# Patient Record
Sex: FEMALE | Race: BLACK OR AFRICAN AMERICAN | Employment: OTHER | URBAN - METROPOLITAN AREA
[De-identification: names, ages, dates, MRNs, and addresses within clinical notes are randomized per-mention and may not be internally consistent; named-entity substitution may affect disease eponyms.]

---

## 2017-02-16 ENCOUNTER — HOSPITAL ENCOUNTER (OUTPATIENT)
Dept: LAB | Age: 47
Discharge: HOME | End: 2017-02-16
Attending: PLASTIC SURGERY
Payer: COMMERCIAL

## 2017-02-16 VITALS — WEIGHT: 175 LBS | HEIGHT: 69 IN | BODY MASS INDEX: 25.92 KG/M2

## 2017-02-16 LAB — HGB BLD-MCNC: 12.3 G/DL (ref 11.7–15.4)

## 2017-02-16 PROCEDURE — 36415 COLL VENOUS BLD VENIPUNCTURE: CPT | Performed by: ANESTHESIOLOGY

## 2017-02-16 PROCEDURE — 85018 HEMOGLOBIN: CPT | Performed by: ANESTHESIOLOGY

## 2017-02-16 RX ORDER — TEMAZEPAM 15 MG/1
15 CAPSULE ORAL
COMMUNITY

## 2017-02-16 RX ORDER — CLONAZEPAM 1 MG/1
2 TABLET ORAL
COMMUNITY

## 2017-02-16 NOTE — PERIOP NOTES
Hgb result within anesthesia guidelines.     Recent Results (from the past 12 hour(s))   HEMOGLOBIN    Collection Time: 02/16/17  2:30 PM   Result Value Ref Range    HGB 12.3 11.7 - 15.4 g/dL

## 2017-02-16 NOTE — PERIOP NOTES
Patient verified name, , and surgery as listed in [facility]. Type 2 surgery, phone assessment complete. Orders not received. Labs per surgeon: no orders on chart at this time  Labs per anesthesia protocol: hgb needed- Pt instructed to go to outpatient lab at Entrance B for blood draw Mon-Fri [id number] prior to day of surgery. Pt verbalizes understanding. Order placed in EMR on hold for arrival.    Pt followed by [facility] for ventricular ectopy- most recent office note (2016) in care everywhere and printed for anesthesia reference. Patient answered medical/surgical history questions at their best of ability. All prior to admission medications documented in [facility]. Patient instructed to take the following medications the day of surgery according to anesthesia guidelines with a small sip of water: omeprazole PRN, amlodipine, metoprolol. Hold all vitamins 7 days prior to surgery and NSAIDS 5 days prior to surgery. Medications to be held- mobic. Patient instructed on the following and verbalizes understanding:  Arrive at [facility], time of arrival to be called the day before by 1700  NPO after midnight including gum, mints, and ice chips  Responsible adult must drive patient to the hospital, stay during surgery, and patient will  need supervision 24 hours after anesthesia  Use Hibiclens in shower the night before surgery and on the morning of surgery  Leave all valuables(money and jewelry) at home but bring insurance card and ID on DOS  Do not wear make-up, nail polish, lotions, cologne, perfumes, powders, or oil on skin.

## 2017-02-17 ENCOUNTER — HOSPITAL ENCOUNTER (OUTPATIENT)
Dept: SURGERY | Age: 47
Discharge: HOME | End: 2017-02-17

## 2017-02-21 ENCOUNTER — ANESTHESIA EVENT (OUTPATIENT)
Dept: SURGERY | Age: 47
End: 2017-02-21
Payer: COMMERCIAL

## 2017-02-22 ENCOUNTER — HOSPITAL ENCOUNTER (OUTPATIENT)
Age: 47
Setting detail: OUTPATIENT SURGERY
Discharge: HOME | End: 2017-02-22
Attending: PLASTIC SURGERY | Admitting: PLASTIC SURGERY
Payer: COMMERCIAL

## 2017-02-22 ENCOUNTER — ANESTHESIA (OUTPATIENT)
Dept: SURGERY | Age: 47
End: 2017-02-22
Payer: COMMERCIAL

## 2017-02-22 ENCOUNTER — SURGERY (OUTPATIENT)
Age: 47
End: 2017-02-22

## 2017-02-22 VITALS
HEART RATE: 72 BPM | DIASTOLIC BLOOD PRESSURE: 79 MMHG | OXYGEN SATURATION: 93 % | TEMPERATURE: 98.5 F | SYSTOLIC BLOOD PRESSURE: 120 MMHG | RESPIRATION RATE: 18 BRPM

## 2017-02-22 PROCEDURE — 77030031139 HC SUT VCRL2 J&J -A: Performed by: PLASTIC SURGERY

## 2017-02-22 PROCEDURE — 74011250637 HC RX REV CODE- 250/637: Performed by: ANESTHESIOLOGY

## 2017-02-22 PROCEDURE — 77030011640 HC PAD GRND REM COVD -A: Performed by: PLASTIC SURGERY

## 2017-02-22 PROCEDURE — 77030013214 HC SUPP BRST CARD -B: Performed by: PLASTIC SURGERY

## 2017-02-22 PROCEDURE — 77030002933 HC SUT MCRYL J&J -A: Performed by: PLASTIC SURGERY

## 2017-02-22 PROCEDURE — 77030020782 HC GWN BAIR PAWS FLX 3M -B: Performed by: ANESTHESIOLOGY

## 2017-02-22 PROCEDURE — 76060000039 HC ANESTHESIA 4 TO 4.5 HR: Performed by: PLASTIC SURGERY

## 2017-02-22 PROCEDURE — 76010000175 HC OR TIME 4 TO 4.5 HR INTENSV-TIER 1: Performed by: PLASTIC SURGERY

## 2017-02-22 PROCEDURE — 77030002974 HC SUT PLN J&J -A: Performed by: PLASTIC SURGERY

## 2017-02-22 PROCEDURE — 76210000016 HC OR PH I REC 1 TO 1.5 HR: Performed by: PLASTIC SURGERY

## 2017-02-22 PROCEDURE — 77030034849: Performed by: PLASTIC SURGERY

## 2017-02-22 PROCEDURE — 77030018836 HC SOL IRR NACL ICUM -A: Performed by: PLASTIC SURGERY

## 2017-02-22 PROCEDURE — 77030032490 HC SLV COMPR SCD KNE COVD -B: Performed by: PLASTIC SURGERY

## 2017-02-22 PROCEDURE — 74011000250 HC RX REV CODE- 250: Performed by: PLASTIC SURGERY

## 2017-02-22 PROCEDURE — 77030002966 HC SUT PDS J&J -A: Performed by: PLASTIC SURGERY

## 2017-02-22 PROCEDURE — 74011250636 HC RX REV CODE- 250/636: Performed by: PLASTIC SURGERY

## 2017-02-22 PROCEDURE — 77030008477 HC STYL SATN SLP COVD -A: Performed by: ANESTHESIOLOGY

## 2017-02-22 PROCEDURE — 74011000250 HC RX REV CODE- 250: Performed by: ANESTHESIOLOGY

## 2017-02-22 PROCEDURE — 77030008703 HC TU ET UNCUF COVD -A: Performed by: ANESTHESIOLOGY

## 2017-02-22 PROCEDURE — 74011250636 HC RX REV CODE- 250/636: Performed by: ANESTHESIOLOGY

## 2017-02-22 PROCEDURE — 74011000250 HC RX REV CODE- 250

## 2017-02-22 PROCEDURE — 74011250636 HC RX REV CODE- 250/636

## 2017-02-22 PROCEDURE — 88305 TISSUE EXAM BY PATHOLOGIST: CPT | Performed by: PLASTIC SURGERY

## 2017-02-22 PROCEDURE — 76210000020 HC REC RM PH II FIRST 0.5 HR: Performed by: PLASTIC SURGERY

## 2017-02-22 PROCEDURE — 74011250637 HC RX REV CODE- 250/637: Performed by: PLASTIC SURGERY

## 2017-02-22 PROCEDURE — 77030002916 HC SUT ETHLN J&J -A: Performed by: PLASTIC SURGERY

## 2017-02-22 PROCEDURE — 77030008467 HC STPLR SKN COVD -B: Performed by: PLASTIC SURGERY

## 2017-02-22 RX ORDER — ROCURONIUM BROMIDE 10 MG/ML
INJECTION, SOLUTION INTRAVENOUS AS NEEDED
Status: DISCONTINUED | OUTPATIENT
Start: 2017-02-22 | End: 2017-02-22 | Stop reason: HOSPADM

## 2017-02-22 RX ORDER — DEXAMETHASONE SODIUM PHOSPHATE 100 MG/10ML
INJECTION INTRAMUSCULAR; INTRAVENOUS AS NEEDED
Status: DISCONTINUED | OUTPATIENT
Start: 2017-02-22 | End: 2017-02-22 | Stop reason: HOSPADM

## 2017-02-22 RX ORDER — ONDANSETRON 8 MG/1
8 TABLET, ORALLY DISINTEGRATING ORAL
COMMUNITY

## 2017-02-22 RX ORDER — NEOSTIGMINE METHYLSULFATE 1 MG/ML
INJECTION INTRAVENOUS AS NEEDED
Status: DISCONTINUED | OUTPATIENT
Start: 2017-02-22 | End: 2017-02-22 | Stop reason: HOSPADM

## 2017-02-22 RX ORDER — BUPIVACAINE HYDROCHLORIDE 5 MG/ML
INJECTION, SOLUTION EPIDURAL; INTRACAUDAL AS NEEDED
Status: DISCONTINUED | OUTPATIENT
Start: 2017-02-22 | End: 2017-02-22 | Stop reason: HOSPADM

## 2017-02-22 RX ORDER — SCOLOPAMINE TRANSDERMAL SYSTEM 1 MG/1
1.5 PATCH, EXTENDED RELEASE TRANSDERMAL
COMMUNITY

## 2017-02-22 RX ORDER — CEFAZOLIN SODIUM IN 0.9 % NACL 2 G/50 ML
2 INTRAVENOUS SOLUTION, PIGGYBACK (ML) INTRAVENOUS ONCE
Status: COMPLETED | OUTPATIENT
Start: 2017-02-22 | End: 2017-02-22

## 2017-02-22 RX ORDER — SODIUM CHLORIDE 0.9 % (FLUSH) 0.9 %
5-10 SYRINGE (ML) INJECTION AS NEEDED
Status: DISCONTINUED | OUTPATIENT
Start: 2017-02-22 | End: 2017-02-22 | Stop reason: HOSPADM

## 2017-02-22 RX ORDER — ONDANSETRON 2 MG/ML
INJECTION INTRAMUSCULAR; INTRAVENOUS AS NEEDED
Status: DISCONTINUED | OUTPATIENT
Start: 2017-02-22 | End: 2017-02-22 | Stop reason: HOSPADM

## 2017-02-22 RX ORDER — NALOXONE HYDROCHLORIDE 0.4 MG/ML
0.2 INJECTION, SOLUTION INTRAMUSCULAR; INTRAVENOUS; SUBCUTANEOUS AS NEEDED
Status: DISCONTINUED | OUTPATIENT
Start: 2017-02-22 | End: 2017-02-22 | Stop reason: HOSPADM

## 2017-02-22 RX ORDER — ACETAMINOPHEN 10 MG/ML
INJECTION, SOLUTION INTRAVENOUS AS NEEDED
Status: DISCONTINUED | OUTPATIENT
Start: 2017-02-22 | End: 2017-02-22 | Stop reason: HOSPADM

## 2017-02-22 RX ORDER — HEPARIN SODIUM 5000 [USP'U]/ML
5000 INJECTION, SOLUTION INTRAVENOUS; SUBCUTANEOUS ONCE
Status: COMPLETED | OUTPATIENT
Start: 2017-02-22 | End: 2017-02-22

## 2017-02-22 RX ORDER — LIDOCAINE HYDROCHLORIDE 10 MG/ML
0.1 INJECTION INFILTRATION; PERINEURAL AS NEEDED
Status: DISCONTINUED | OUTPATIENT
Start: 2017-02-22 | End: 2017-02-22 | Stop reason: HOSPADM

## 2017-02-22 RX ORDER — HYDROMORPHONE HYDROCHLORIDE 2 MG/ML
0.5 INJECTION, SOLUTION INTRAMUSCULAR; INTRAVENOUS; SUBCUTANEOUS
Status: DISCONTINUED | OUTPATIENT
Start: 2017-02-22 | End: 2017-02-22 | Stop reason: HOSPADM

## 2017-02-22 RX ORDER — LIDOCAINE HYDROCHLORIDE 20 MG/ML
INJECTION, SOLUTION EPIDURAL; INFILTRATION; INTRACAUDAL; PERINEURAL AS NEEDED
Status: DISCONTINUED | OUTPATIENT
Start: 2017-02-22 | End: 2017-02-22 | Stop reason: HOSPADM

## 2017-02-22 RX ORDER — KETOROLAC TROMETHAMINE 30 MG/ML
INJECTION, SOLUTION INTRAMUSCULAR; INTRAVENOUS AS NEEDED
Status: DISCONTINUED | OUTPATIENT
Start: 2017-02-22 | End: 2017-02-22 | Stop reason: HOSPADM

## 2017-02-22 RX ORDER — FENTANYL CITRATE 50 UG/ML
INJECTION, SOLUTION INTRAMUSCULAR; INTRAVENOUS AS NEEDED
Status: DISCONTINUED | OUTPATIENT
Start: 2017-02-22 | End: 2017-02-22 | Stop reason: HOSPADM

## 2017-02-22 RX ORDER — OXYCODONE AND ACETAMINOPHEN 5; 325 MG/1; MG/1
1 TABLET ORAL AS NEEDED
Status: DISCONTINUED | OUTPATIENT
Start: 2017-02-22 | End: 2017-02-22 | Stop reason: HOSPADM

## 2017-02-22 RX ORDER — SODIUM CHLORIDE, SODIUM LACTATE, POTASSIUM CHLORIDE, CALCIUM CHLORIDE 600; 310; 30; 20 MG/100ML; MG/100ML; MG/100ML; MG/100ML
75 INJECTION, SOLUTION INTRAVENOUS CONTINUOUS
Status: DISCONTINUED | OUTPATIENT
Start: 2017-02-22 | End: 2017-02-22 | Stop reason: HOSPADM

## 2017-02-22 RX ORDER — FAMOTIDINE 20 MG/1
20 TABLET, FILM COATED ORAL ONCE
Status: COMPLETED | OUTPATIENT
Start: 2017-02-22 | End: 2017-02-22

## 2017-02-22 RX ORDER — PROPOFOL 10 MG/ML
INJECTION, EMULSION INTRAVENOUS AS NEEDED
Status: DISCONTINUED | OUTPATIENT
Start: 2017-02-22 | End: 2017-02-22 | Stop reason: HOSPADM

## 2017-02-22 RX ORDER — MIDAZOLAM HYDROCHLORIDE 1 MG/ML
2 INJECTION, SOLUTION INTRAMUSCULAR; INTRAVENOUS
Status: DISCONTINUED | OUTPATIENT
Start: 2017-02-22 | End: 2017-02-22 | Stop reason: HOSPADM

## 2017-02-22 RX ORDER — MINERAL OIL
OIL (ML) MISCELLANEOUS AS NEEDED
Status: DISCONTINUED | OUTPATIENT
Start: 2017-02-22 | End: 2017-02-22 | Stop reason: HOSPADM

## 2017-02-22 RX ORDER — BACITRACIN ZINC 500 UNIT/G
OINTMENT (GRAM) TOPICAL AS NEEDED
Status: DISCONTINUED | OUTPATIENT
Start: 2017-02-22 | End: 2017-02-22 | Stop reason: HOSPADM

## 2017-02-22 RX ORDER — GLYCOPYRROLATE 0.2 MG/ML
INJECTION INTRAMUSCULAR; INTRAVENOUS AS NEEDED
Status: DISCONTINUED | OUTPATIENT
Start: 2017-02-22 | End: 2017-02-22 | Stop reason: HOSPADM

## 2017-02-22 RX ADMIN — SODIUM CHLORIDE, SODIUM LACTATE, POTASSIUM CHLORIDE, AND CALCIUM CHLORIDE: 600; 310; 30; 20 INJECTION, SOLUTION INTRAVENOUS at 10:20

## 2017-02-22 RX ADMIN — FENTANYL CITRATE 100 MCG: 50 INJECTION, SOLUTION INTRAMUSCULAR; INTRAVENOUS at 10:22

## 2017-02-22 RX ADMIN — CEFAZOLIN 2 G: 1 INJECTION, POWDER, FOR SOLUTION INTRAMUSCULAR; INTRAVENOUS; PARENTERAL at 14:00

## 2017-02-22 RX ADMIN — Medication 14 G: at 13:07

## 2017-02-22 RX ADMIN — LIDOCAINE HYDROCHLORIDE 70 MG: 20 INJECTION, SOLUTION EPIDURAL; INFILTRATION; INTRACAUDAL; PERINEURAL at 10:24

## 2017-02-22 RX ADMIN — FENTANYL CITRATE 50 MCG: 50 INJECTION, SOLUTION INTRAMUSCULAR; INTRAVENOUS at 14:27

## 2017-02-22 RX ADMIN — GLYCOPYRROLATE 0.4 MG: 0.2 INJECTION INTRAMUSCULAR; INTRAVENOUS at 14:14

## 2017-02-22 RX ADMIN — HYDROMORPHONE HYDROCHLORIDE 0.5 MG: 2 INJECTION, SOLUTION INTRAMUSCULAR; INTRAVENOUS; SUBCUTANEOUS at 15:13

## 2017-02-22 RX ADMIN — KETOROLAC TROMETHAMINE 30 MG: 30 INJECTION, SOLUTION INTRAMUSCULAR; INTRAVENOUS at 14:14

## 2017-02-22 RX ADMIN — BUPIVACAINE HYDROCHLORIDE 30 ML: 5 INJECTION, SOLUTION EPIDURAL; INTRACAUDAL; PERINEURAL at 11:44

## 2017-02-22 RX ADMIN — FENTANYL CITRATE 50 MCG: 50 INJECTION, SOLUTION INTRAMUSCULAR; INTRAVENOUS at 10:48

## 2017-02-22 RX ADMIN — NEOSTIGMINE METHYLSULFATE 3 MG: 1 INJECTION INTRAVENOUS at 14:14

## 2017-02-22 RX ADMIN — FENTANYL CITRATE 50 MCG: 50 INJECTION, SOLUTION INTRAMUSCULAR; INTRAVENOUS at 10:51

## 2017-02-22 RX ADMIN — ACETAMINOPHEN 1000 MG: 10 INJECTION, SOLUTION INTRAVENOUS at 13:48

## 2017-02-22 RX ADMIN — MIDAZOLAM HYDROCHLORIDE 2 MG: 1 INJECTION, SOLUTION INTRAMUSCULAR; INTRAVENOUS at 10:16

## 2017-02-22 RX ADMIN — Medication 10 ML: at 13:06

## 2017-02-22 RX ADMIN — SODIUM CHLORIDE, SODIUM LACTATE, POTASSIUM CHLORIDE, AND CALCIUM CHLORIDE 75 ML/HR: 600; 310; 30; 20 INJECTION, SOLUTION INTRAVENOUS at 09:20

## 2017-02-22 RX ADMIN — PROPOFOL 150 MG: 10 INJECTION, EMULSION INTRAVENOUS at 10:24

## 2017-02-22 RX ADMIN — GLYCOPYRROLATE 0.2 MG: 0.2 INJECTION INTRAMUSCULAR; INTRAVENOUS at 11:07

## 2017-02-22 RX ADMIN — SODIUM CHLORIDE, SODIUM LACTATE, POTASSIUM CHLORIDE, AND CALCIUM CHLORIDE: 600; 310; 30; 20 INJECTION, SOLUTION INTRAVENOUS at 12:00

## 2017-02-22 RX ADMIN — FENTANYL CITRATE 50 MCG: 50 INJECTION, SOLUTION INTRAMUSCULAR; INTRAVENOUS at 11:59

## 2017-02-22 RX ADMIN — DEXAMETHASONE SODIUM PHOSPHATE 10 MG: 100 INJECTION INTRAMUSCULAR; INTRAVENOUS at 13:50

## 2017-02-22 RX ADMIN — FAMOTIDINE 20 MG: 20 TABLET ORAL at 09:20

## 2017-02-22 RX ADMIN — LIDOCAINE HYDROCHLORIDE 0.1 ML: 10 INJECTION, SOLUTION INFILTRATION; PERINEURAL at 09:20

## 2017-02-22 RX ADMIN — HEPARIN SODIUM 5000 UNITS: 5000 INJECTION, SOLUTION INTRAVENOUS; SUBCUTANEOUS at 09:20

## 2017-02-22 RX ADMIN — ONDANSETRON 4 MG: 2 INJECTION INTRAMUSCULAR; INTRAVENOUS at 12:57

## 2017-02-22 RX ADMIN — ROCURONIUM BROMIDE 50 MG: 10 INJECTION, SOLUTION INTRAVENOUS at 10:25

## 2017-02-22 RX ADMIN — CEFAZOLIN 2 G: 1 INJECTION, POWDER, FOR SOLUTION INTRAMUSCULAR; INTRAVENOUS; PARENTERAL at 10:23

## 2017-02-22 NOTE — IP AVS SNAPSHOT
Kash Ego 
 
 
 [address] 
[phone] Patient: Fany Jara MRN: [id number] You are allergic to the following No active allergies Recent Documentation OB Status Menopause Emergency Contacts Name Discharge Info Relation Home Work Mobile Erwin Norman  Spouse [3] [phone] About your hospitalization You were admitted on:  February 22, 2017 You last received care in the:  [facility] PACU You were discharged on:  February 22, 2017 Unit phone number:  [phone] Why you were hospitalized Your primary diagnosis was:  Not on File Providers Seen During Your Hospitalizations Provider Role Specialty Primary office phone Reji Omalley MD Attending Provider Plastic Surgery [phone] Your Primary Care Physician (PCP) Primary Care Physician Office Phone Office Fax Melany Sue [phone] Follow-up Information Follow up With Details Comments Contact Info Basilio Allen MD   [address] [place] [address] [address] 
[phone] Reji Omalley MD Schedule an appointment as soon as possible for a visit in 1 week(s)  [address] [place] [id number] 
[phone] Current Discharge Medication List  
  
CONTINUE these medications which have CHANGED Dose & Instructions Dispensing Information Comments Morning Noon Evening Bedtime PARoxetine 20 mg tablet Commonly known as:  PAXIL What changed:  when to take this Your next dose is: Today, Tomorrow Other:  _________ Dose:  20 mg Take 1 Tab by mouth daily. Quantity:  30 Tab Refills:  6 CONTINUE these medications which have NOT CHANGED Dose & Instructions Dispensing Information Comments Morning Noon Evening Bedtime  
 amLODIPine 5 mg tablet Commonly known as:  Arlyss Emilee Your next dose is: Today, Tomorrow Other:  _________ Dose:  5 mg Take 1 Tab by mouth daily. Quantity:  90 Tab Refills:  1 KlonoPIN 1 mg tablet Generic drug:  clonazePAM  
   
Your next dose is: Today, Tomorrow Other:  _________ Dose:  2 mg Take 2 mg by mouth nightly. Refills:  0 LORazepam 1 mg tablet Commonly known as:  ATIVAN Your next dose is: Today, Tomorrow Other:  _________ Pt to take 1/2-1 tab every 8 hours as needed Quantity:  30 Tab Refills:  2  
     
   
   
   
  
 metoprolol succinate 25 mg XL tablet Commonly known as:  TOPROL-XL Your next dose is: Today, Tomorrow Other:  _________ Dose:  25 mg Take 25 mg by mouth daily. Refills:  0 PriLOSEC 20 mg capsule Generic drug:  omeprazole Your next dose is: Today, Tomorrow Other:  _________ Dose:  20 mg Take 20 mg by mouth as needed. Refills:  0 RESTORIL 15 mg capsule Generic drug:  temazepam  
   
Your next dose is: Today, Tomorrow Other:  _________ Dose:  15 mg Take 15 mg by mouth nightly as needed for Sleep. Refills:  0  
     
   
   
   
  
 scopolamine 1.5 mg (1 mg over 3 days) Pt3d Commonly known as:  TRANSDERM-SCOP Your next dose is: Today, Tomorrow Other:  _________ Dose:  1.5 mg  
1.5 mg by TransDERmal route every seventy-two (72) hours. Refills:  0 ZOFRAN ODT 8 mg disintegrating tablet Generic drug:  ondansetron Your next dose is: Today, Tomorrow Other:  _________ Dose:  8 mg Take 8 mg by mouth every eight (8) hours as needed for Nausea. Refills:  0 STOP taking these medications   
 eszopiclone 3 mg tablet Commonly known as:  LUNESTA  
   
  
 meloxicam 7.5 mg tablet Commonly known as:  MOBIC  
   
  
 zolpidem 10 mg tablet Commonly known as:  AMBIEN  
   
  
  
ASK your doctor about these medications Dose & Instructions Dispensing Information Comments Morning Noon Evening Bedtime  
 progesterone 100 mg capsule Commonly known as:  PROMETRIUM Your next dose is: Today, Tomorrow Other:  _________ TAKE ONE CAPSULE BY MOUTH AT BEDTIME Refills:  3 Discharge Instructions Leave dressings on until follow up Follow up in one week Regular diet Begin po antibiotics tonight and take as directed until gone No lifting > 5 lbs Patient may sponge bathe INSTRUCTIONS FOLLOWING BREAST SURGERY 
 
ACTIVITY  As tolerated or as directed by your doctor.  DO NOT wear tight or restrictive clothing.  Avoid heavy lifting or pulling (no more than 5 pounds). DIET  Clear liquids until no nausea or vomiting; then light diet for the first day.  Advance to regular diet on second day, unless your doctor orders otherwise.  If nausea or vomiting continue, call your doctor. PAIN 
 Take pain medication as directed by your doctor.  Call your doctor if pain is NOT relieved by medication.  DO NOT take aspirin or blood thinners unless directed by your doctor. DRESSINGS  If directed by your doctor, remove your dressings 48 hours after you go home. If your wound has small Steri-Strips undemeath the dressing, these may be 
  left in place for at least one week, or as directed. SHOWERS AND BATHING 
 You may shower or bathe 48 hours after your surgery. DO NOT submerge  
  the incisions or drain sites in a tub bath.  If you have drains, loop the tab through a belt or scarf tied around your waist,  
  to free up your hands.  Showering with the drains in place is not harmful. DRAINS  If you go home with a drain, you or a family member will be given care  
  instructions. You will be shown how to empty, measure and record the   
  output. This output number is important, as it will determine when the drain  
  may be removed. The nursing staff will review drain care with you before you go home. FOLLOW-UP PHONE CALLS  Calls will be made by nursing staff.  If you have any problems, call your doctor. CALL YOUR DOCTOR IF YOU HAVE: 
 Excessive bleeding that does not stop after holding mild pressure over the area  Temperature of 101°F or above  Redness, excessive swelling or bruising, uneven size or hardness of the breast,  
  and/or green or yellow, foul-smelling discharge from incision.  Excessive leakage around drain site AFTER ANESTHESIA  For the first 24 hours: DO NOT drive, drink alcoholic beverages, or make important    
  decisions.  Be aware of dizziness following anesthesia and while taking pain medication. Discharge Orders None Introducing [facility]! Dear Genoveva Castillo: Thank you for requesting a [employer] account. Our records indicate that you already have an active [employer] account. You can access your account anytime at [email or web address] Did you know that you can access your hospital and ER discharge instructions at any time in [employer]? You can also review all of your test results from your hospital stay or ER visit. Additional Information If you have questions, please visit the Frequently Asked Questions section of the [employer] website at [email or web address] Remember, [employer] is NOT to be used for urgent needs. For medical emergencies, dial 911. Now available from your iPhone and Android! General Information Please provide this summary of care documentation to your next provider.  
  
  
    
    
 Patient Signature: ____________________________________________________________ Date:  ____________________________________________________________  
  
Burlene Arnulfo Provider Signature:  ____________________________________________________________ Date:  ____________________________________________________________

## 2017-02-22 NOTE — ANESTHESIA PREPROCEDURE EVALUATION
Anesthetic History   No history of anesthetic complications            Review of Systems / Medical History  Patient summary reviewed, nursing notes reviewed and pertinent labs reviewed    Pulmonary  Within defined limits                 Neuro/Psych         Psychiatric history     Cardiovascular    Hypertension: well controlled        Dysrhythmias : PVC      Exercise tolerance: >4 METS     GI/Hepatic/Renal     GERD: well controlled           Endo/Other             Other Findings              Physical Exam    Airway  Mallampati: II    Neck ROM: normal range of motion   Mouth opening: Normal     Cardiovascular  Regular rate and rhythm,  S1 and S2 normal,  no murmur, click, rub, or gallop             Dental  No notable dental hx       Pulmonary  Breath sounds clear to auscultation               Abdominal         Other Findings            Anesthetic Plan    ASA: 2  Anesthesia type: general          Induction: Intravenous  Anesthetic plan and risks discussed with: Patient and Spouse

## 2017-02-22 NOTE — IP AVS SNAPSHOT
Current Discharge Medication List  
  
Take these medications at their scheduled times Dose & Instructions Dispensing Information Comments Morning Noon Evening Bedtime  
 amLODIPine 5 mg tablet Commonly known as:  [place] Hiren Your next dose is: Today, Tomorrow Other:  ____________ Dose:  5 mg Take 1 Tab by mouth daily. Quantity:  90 Tab Refills:  1 KlonoPIN 1 mg tablet Generic drug:  clonazePAM  
   
Your next dose is: Today, Tomorrow Other:  ____________ Dose:  2 mg Take 2 mg by mouth nightly. Refills:  0  
     
   
   
   
  
 metoprolol succinate 25 mg XL tablet Commonly known as:  TOPROL-XL Your next dose is: Today, Tomorrow Other:  ____________ Dose:  25 mg Take 25 mg by mouth daily. Refills:  0 PARoxetine 20 mg tablet Commonly known as:  PAXIL Your next dose is: Today, Tomorrow Other:  ____________ Dose:  20 mg Take 1 Tab by mouth daily. Quantity:  30 Tab Refills:  6  
     
   
   
   
  
 scopolamine 1.5 mg (1 mg over 3 days) Pt3d Commonly known as:  TRANSDERM-SCOP Your next dose is: Today, Tomorrow Other:  ____________ Dose:  1.5 mg  
1.5 mg by TransDERmal route every seventy-two (72) hours. Refills:  0 Take these medications as needed Dose & Instructions Dispensing Information Comments Morning Noon Evening Bedtime PriLOSEC 20 mg capsule Generic drug:  omeprazole Your next dose is: Today, Tomorrow Other:  ____________ Dose:  20 mg Take 20 mg by mouth as needed. Refills:  0 RESTORIL 15 mg capsule Generic drug:  temazepam  
   
Your next dose is: Today, Tomorrow Other:  ____________ Dose:  15 mg Take 15 mg by mouth nightly as needed for Sleep. Refills:  0 ZOFRAN ODT 8 mg disintegrating tablet Generic drug:  ondansetron Your next dose is: Today, Tomorrow Other:  ____________ Dose:  8 mg Take 8 mg by mouth every eight (8) hours as needed for Nausea. Refills:  0 Take these medications as directed Dose & Instructions Dispensing Information Comments Morning Noon Evening Bedtime LORazepam 1 mg tablet Commonly known as:  ATIVAN Your next dose is: Today, Tomorrow Other:  ____________ Pt to take 1/2-1 tab every 8 hours as needed Quantity:  30 Tab Refills:  2 ASK your doctor about these medications Dose & Instructions Dispensing Information Comments Morning Noon Evening Bedtime  
 progesterone 100 mg capsule Commonly known as:  PROMETRIUM Your next dose is: Today, Tomorrow Other:  ____________ TAKE ONE CAPSULE BY MOUTH AT BEDTIME Refills:  3

## 2017-02-22 NOTE — ANESTHESIA POSTPROCEDURE EVALUATION
Post-Anesthesia Evaluation and Assessment    Patient: Geoffrey Merida MRN: [id number]  SSN: [id number]    YOB: 1970  Age: 49 y.o. Sex: female       Cardiovascular Function/Vital Signs  Visit Vitals    /76 (BP 1 Location: Left arm, BP Patient Position: At rest)    Pulse 76    Temp 36.9 °C (98.5 °F)    Resp 16    SpO2 93%       Patient is status post general anesthesia for Procedure(s):  DELAYED RIGHT BREAST RECONSTRUCTION WITH TISSUE REARRANGEMENT AND REDUCTION    LEFT BREAST REDUCTION FOR SYMMETRY. Nausea/Vomiting: None    Postoperative hydration reviewed and adequate. Pain:  Pain Scale 1: Numeric (0 - 10) (02/22/17 1513)  Pain Intensity 1: 3 (02/22/17 1513)   Managed    Neurological Status:   Neuro (WDL): Exceptions to WDL (02/22/17 1512)  Neuro  Neurologic State: Drowsy (02/22/17 1512)  Cognition: Follows commands (02/22/17 1512)  Speech: Clear (02/22/17 1512)  LUE Motor Response: Purposeful (02/22/17 1512)  LLE Motor Response: Purposeful (02/22/17 1512)  RUE Motor Response: Purposeful (02/22/17 1512)  RLE Motor Response: Purposeful (02/22/17 1512)   At baseline    Mental Status and Level of Consciousness: Arousable    Pulmonary Status:   O2 Device: Room air (02/22/17 1512)   Adequate oxygenation and airway patent    Complications related to anesthesia: None    Post-anesthesia assessment completed.  No concerns    Signed By: Vivien Goodrich MD     February 22, 2017

## 2017-02-22 NOTE — BRIEF OP NOTE
BRIEF OPERATIVE NOTE    Date of Procedure: 2/22/2017   Preoperative Diagnosis: History of breast cancer [Z85.3]  Postoperative Diagnosis: History of breast cancer [Z85.3]    Procedure(s):  DELAYED RIGHT BREAST RECONSTRUCTION WITH TISSUE REARRANGEMENT AND REDUCTION    LEFT BREAST REDUCTION FOR SYMMETRY  Surgeon(s) and Role:     * Diamond Griffin MD - Primary            Surgical Staff:  Circ-1: Luis Barnes RN  Circ-Relief: Jake Ortega RN  Scrub Tech-1: Cornelia Leal RN; [facility]  Scrub Tech-2: Kim Wright  Scrub Tech-Relief: Shabbir Soni  Event Time In   Incision Start 1043   Incision Close 1409     Anesthesia: General   Estimated Blood Loss: 50 - 75 cc  Specimens:   ID Type Source Tests Collected by Time Destination   1 : RIGHT BREAST TISSUE Fresh Breast  Thomas Spicer III, MD 2/22/2017 1048 Pathology   2 : LEFT BREAST TISSUE Fresh Breast  Thomas Spicer III, MD 2/22/2017 1049 Pathology      Findings: bilateral macromastia, s/p right lumpectomy AND and XRT,  Left breast larger than right   Complications: none  Implants: * No implants in log *

## 2017-02-22 NOTE — PERIOP NOTES
Telephoned pt's  in surgery waiting area to update him on surgery progression and to inform him that pt is stable.  Briana Gil

## 2017-02-22 NOTE — DISCHARGE INSTRUCTIONS
Leave dressings on until follow up  Follow up in one week  Regular diet  Begin po antibiotics tonight and take as directed until gone  No lifting > 5 lbs  Patient may sponge bathe        INSTRUCTIONS FOLLOWING BREAST SURGERY    ACTIVITY   As tolerated or as directed by your doctor.  DO NOT wear tight or restrictive clothing.  Avoid heavy lifting or pulling (no more than 5 pounds). DIET   Clear liquids until no nausea or vomiting; then light diet for the first day.  Advance to regular diet on second day, unless your doctor orders otherwise.  If nausea or vomiting continue, call your doctor. PAIN   Take pain medication as directed by your doctor.  Call your doctor if pain is NOT relieved by medication.  DO NOT take aspirin or blood thinners unless directed by your doctor. DRESSINGS   If directed by your doctor, remove your dressings 48 hours after you go home. If your wound has small Steri-Strips undemeath the dressing, these may be    left in place for at least one week, or as directed. SHOWERS AND BATHING   You may shower or bathe 48 hours after your surgery. DO NOT submerge     the incisions or drain sites in a tub bath.  If you have drains, loop the tab through a belt or scarf tied around your waist,     to free up your hands. Showering with the drains in place is not harmful. DRAINS   If you go home with a drain, you or a family member will be given care     instructions. You will be shown how to empty, measure and record the      output. This output number is important, as it will determine when the drain     may be removed. The nursing staff will review drain care with you before you go home. FOLLOW-UP PHONE [address] will be made by nursing staff.  If you have any problems, call your doctor.     CALL YOUR DOCTOR IF YOU HAVE:   Excessive bleeding that does not stop after holding mild pressure over the area   Temperature of 101°F or above   Redness, excessive swelling or bruising, uneven size or hardness of the breast,     and/or green or yellow, foul-smelling discharge from incision.  Excessive leakage around drain site    AFTER ANESTHESIA   For the first 24 hours: DO NOT drive, drink alcoholic beverages, or make important       decisions.  Be aware of dizziness following anesthesia and while taking pain medication.

## 2017-02-24 NOTE — OP NOTES
[facility]   OPERATIVE REPORT       Name:  Lionel Bloch   MR#:  [id number]   :  1970   Account #:  [de-identified]   Date of Adm:  2017       DATE OF PROCEDURE: 2017. PREPROCEDURE DIAGNOSES:   1. Deformity of the right breast following lumpectomy and   radiation therapy for breast cancer. 2. Bilateral symptomatic macromastia and breast asymmetry due to   prior cancer treatment on the right. POSTPROCEDURE DIAGNOSES   1. Deformity of the right breast following lumpectomy and   radiation therapy for breast cancer. 2. Bilateral symptomatic macromastia and breast asymmetry due to   prior cancer treatment on the right. NAME OF PROCEDURE:   1. Right breast reconstruction by a local tissue rearrangement. 2. Contralateral left reduction mammoplasty for symmetry. SURGEON: Brenda Patton MD.    Minerva Beard CST. ANESTHESIA: General.    ESTIMATED BLOOD LOSS: 50 mL. FLUIDS IN OPERATION: 2500 mL of crystalloid. CULTURES: None. DRAINS: None. COMPLICATIONS: None. CONDITION AT CLOSE OF PROCEDURE: Stable. INDICATIONS: The patient had previously undergone a lumpectomy   in the upper outer quadrant of her right breast, followed by   radiation therapy. Upon healing from this, she had developed loss   of volume on the right breast and also displacement of the   nipple-areolar complex superolaterally, creating significant   breast asymmetry. This was in the setting of preexisting severe   symptomatic macromastia. INFORMED CONSENT: After reviewing the options for reconstruction   of the right breast by reduction and rearrangement of the tissue   and contralateral reduction mammoplasty for symmetry, she has   elected to proceed.  She states she understands the risks of the   procedure to include, but not be limited to, bleeding,   infection, scars, asymmetry, poor cosmetic result, loss of   nipple sensation, loss of part or all of the nipple-areolar   complex, native skin necrosis, fat necrosis resulting in firm   areas, hematoma or seroma formation, nerve injury, deep venous   thrombosis, pulmonary emboli, the need for transfusion, the need   for hospitalization, and the remote risk of death. Understanding   these issues, she wished to proceed. PROCEDURE: The patient was met in the holding area where, in the   sitting position, the midline was marked from the sternal notch   to the umbilicus. The inframammary folds were marked   bilaterally. The level of the fold was transposed onto the   anterior aspect of the breast in the meridian. A standard Wise   pattern reduction with an inferior pedicle was designed   bilaterally reducing the areola to 42 mm. On the right, the   pedicle was marked at about 8 cm to help maximize vascularity. On the left side, the pedicle was marked at about 7 cm to help   deal with the larger volume on that side. The patient was then taken to the operating room and placed in   the supine position on a well-padded operating room table. Sequential compression devices were placed on her lower   extremities, a pillow was placed behind her knees, her heels   were padded, and her arms were secured to well-padded arm   boards. She received IV antibiotics and 5000 units of heparin   subcutaneously. After adequate induction of general anesthesia,   the chest was prepped and draped in the usual sterile fashion. The pedicles were then developed with electrocautery, taking   care not to undermine. The patient was noted to have   fixed, stiff, breast tissue with relatively long pedicles. The   medial, lateral and superior resections were performed. The   superior breast flap was aggressively thinned, especially on the   left side, again to help compensate for the larger volume.  On the   left, the skin was temporarily tacked with surgical staples and   the 42 mm cookie cutter was centered over the apex of the   vertical limb and the corresponding disk of tissue was removed. The nipple-areolar complex was extremely difficult to bring to   the surface and due to the thickness and stiffness of the   pedicle, there was significant posterior traction away from the   skin. At this point, I elected to leave the nipple-areolar   complex unattached to the skin and the right breast was   addressed as well. Again, the skin was temporarily tacked with surgical staples. The cookie cutter was applied to the apex of the vertical limb. The corresponding disk of tissue was removed and the nipple-  areolar complex was again extremely difficult to bring to the   surface due to the stiffness and the length of the pedicle. Any   efforts at placing the nipple areolar complex at the skin level   resulted in severe venous congestion. I did not feel this was   related to temporary swelling, but again more to the length of   the pedicle, which when folded to place under the skin flaps,   resulted in retraction inward. My concern over the tightness of   the closure and the vascularity of the nipple was significant   enough that I elected to alter the original plan and utilize a   free-nipple-grafting technique. At this point, the nipple-areolar complexes were removed with a   15 blade and kept marked right and left sides. The more cephalic   6-8 cm of the pedicle was then resected allowing closure under   significantly less tension and allowing the portion of the   pedicle immediately below the opening for the nipple-areolar   complex to come to the skin with no difficulty and no torsion on   the pedicle. At this point, the vertical and inframammary limbs were closed   with a running 3-0 Vicryl, followed by a 4-0 Monocryl. The left   nipple-areolar complex was inset with a running 4-0 plain gut   suture and then a tie-over bolster of cotton balls moistened   with mineral oil and Xeroform, utilizing 5-0 nylon.  On the right   side, where there had been previous radiation, the areolar   tissues were quite thick and this was thinned with a pair of   tenotomy scissors and then the areola was inset as on the left   side with a 4-0 running plain gut around the perimeter and a   tie-over bolster. Prior to applying the bolster, the patient was sat upright and   inspected and had nice size, shape, projection and symmetry. The   nipple-areolar complexes were situated in an appropriate   position. The patient does have some indentation along the   inferolateral aspect of the right breast from the original   lumpectomy scar, but overall the symmetry was quite good. At this point, the areas were washed and dried. Dressings were   applied followed by a surgical bra. She was awakened, extubated,   and transferred to the recovery room in stable condition. ADDENDUM     The final weight of resection on the right was 1185 grams; on   the left, it was 1689 grams.         MD Ave Castellanos / Tara Marte   D:  02/23/2017   12:02   T:  02/24/2017   03:41   Job #:  [id number]

## 2017-02-24 NOTE — OP NOTES
[facility] 64   OPERATIVE REPORT       Name:  Nick Sandoval   MR#:  [id number]   :  1970   Account #:  [de-identified]   Date of Adm:  2017       DATE OF PROCEDURE: 2017. PREPROCEDURE DIAGNOSES:   1. Deformity of the right breast following lumpectomy and   radiation therapy for breast cancer. 2. Bilateral symptomatic macromastia and breast asymmetry due to   prior cancer treatment on the right. POSTPROCEDURE DIAGNOSES   1. Deformity of the right breast following lumpectomy and   radiation therapy for breast cancer. 2. Bilateral symptomatic macromastia and breast asymmetry due to   prior cancer treatment on the right. NAME OF PROCEDURE:   1. Right breast reconstruction by a local tissue rearrangement. 2. Contralateral left reduction mammoplasty for symmetry. SURGEON: Anh Yoder MD.    Janna Wood CST. ANESTHESIA: General.    ESTIMATED BLOOD LOSS: 50 mL. FLUIDS IN OPERATION: 2500 mL of crystalloid. CULTURES: None. DRAINS: None. COMPLICATIONS: None. CONDITION AT CLOSE OF PROCEDURE: Stable. INDICATIONS: The patient had previously undergone a lumpectomy   in the upper outer quadrant of her right breast, followed by   radiation therapy. Upon healing from this, she had developed loss   of volume on the right breast and also displacement of the   nipple-areolar complex superolaterally, creating significant   breast asymmetry. This was in the setting of preexisting severe   symptomatic macromastia. INFORMED CONSENT: After reviewing the options for reconstruction   of the right breast by reduction and rearrangement of the tissue   and contralateral reduction mammoplasty for symmetry, she has   elected to proceed.  She states she understands the risks of the   procedure to include, but not be limited to, bleeding,   infection, scars, asymmetry, poor cosmetic result, loss of   nipple sensation, loss of part or all of the nipple-areolar   complex, native skin necrosis, fat necrosis resulting in firm   areas, hematoma or seroma formation, nerve injury, deep venous   thrombosis, pulmonary emboli, the need for transfusion, the need   for hospitalization, and the remote risk of death. Understanding   these issues, she wished to proceed. PROCEDURE: The patient was met in the holding area where, in the   sitting position, the midline was marked from the sternal notch   to the umbilicus. The inframammary folds were marked   bilaterally. The level of the fold was transposed onto the   anterior aspect of the breast in the meridian. A standard Wise   pattern reduction with an inferior pedicle was designed   bilaterally reducing the areola to 42 mm. On the right, the   pedicle was marked at about 8 cm to help maximize vascularity. On the left side, the pedicle was marked at about 7 cm to help   deal with the larger volume on that side. The patient was then taken to the operating room and placed in   the supine position on a well-padded operating room table. Sequential compression devices were placed on her lower   extremities, a pillow was placed behind her knees, her heels   were padded, and her arms were secured to well-padded arm   boards. She received IV antibiotics and 5000 units of heparin   subcutaneously. After adequate induction of general anesthesia,   the chest was prepped and draped in the usual sterile fashion. The pedicles were then developed with electrocautery, taking   care not to undermine. The patient was noted to have   fixed, stiff, breast tissue with relatively long pedicles. The   medial, lateral and superior resections were performed. The   superior breast flap was aggressively thinned, especially on the   left side, again to help compensate for the larger volume.  On the   left, the skin was temporarily tacked with surgical staples and   the 42 mm cookie cutter was centered over the apex of the   vertical limb and the corresponding disk of tissue was removed. The nipple-areolar complex was extremely difficult to bring to   the surface and due to the thickness and stiffness of the   pedicle, there was significant posterior traction away from the   skin. At this point, I elected to leave the nipple-areolar   complex unattached to the skin and the right breast was   addressed as well. Again, the skin was temporarily tacked with surgical staples. The cookie cutter was applied to the apex of the vertical limb. The corresponding disk of tissue was removed and the nipple-  areolar complex was again extremely difficult to bring to the   surface due to the stiffness and the length of the pedicle. Any   efforts at placing the nipple areolar complex at the skin level   resulted in severe venous congestion. I did not feel this was   related to temporary swelling, but again more to the length of   the pedicle, which when folded to place under the skin flaps,   resulted in retraction inward. My concern over the tightness of   the closure and the vascularity of the nipple was significant   enough that I elected to alter the original plan and utilize a   free-nipple-grafting technique. At this point, the nipple-areolar complexes were removed with a   15 blade and kept marked right and left sides. The more cephalic   6-8 cm of the pedicle was then resected allowing closure under   significantly less tension and allowing the portion of the   pedicle immediately below the opening for the nipple-areolar   complex to come to the skin with no difficulty and no torsion on   the pedicle. At this point, the vertical and inframammary limbs were closed   with a running 3-0 Vicryl, followed by a 4-0 Monocryl. The left   nipple-areolar complex was inset with a running 4-0 plain gut   suture and then a tie-over bolster of cotton balls moistened   with mineral oil and Xeroform, utilizing 5-0 nylon.  On the right   side, where there had been previous radiation, the areolar   tissues were quite thick and this was thinned with a pair of   tenotomy scissors and then the areola was inset as on the left   side with a 4-0 running plain gut around the perimeter and a   tie-over bolster. Prior to applying the bolster, the patient was sat upright and   inspected and had nice size, shape, projection and symmetry. The   nipple-areolar complexes were situated in an appropriate   position. The patient does have some indentation along the   inferolateral aspect of the right breast from the original   lumpectomy scar, but overall the symmetry was quite good. At this point, the areas were washed and dried. Dressings were   applied followed by a surgical bra. She was awakened, extubated,   and transferred to the recovery room in stable condition.         Nathalie Sevilla MD      [facility] / [facility]   D:  02/23/2017   12:02   T:  02/24/2017   03:41   Job #:  [id number]

## 2018-12-20 PROCEDURE — 88305 TISSUE EXAM BY PATHOLOGIST: CPT

## 2018-12-21 ENCOUNTER — HOSPITAL ENCOUNTER (OUTPATIENT)
Dept: LAB | Age: 48
Discharge: HOME | End: 2018-12-21

## 2022-02-02 ENCOUNTER — APPOINTMENT (RX ONLY)
Dept: URBAN - METROPOLITAN AREA CLINIC 349 | Facility: CLINIC | Age: 52
Setting detail: DERMATOLOGY
End: 2022-02-02

## 2022-02-02 DIAGNOSIS — Z41.9 ENCOUNTER FOR PROCEDURE FOR PURPOSES OTHER THAN REMEDYING HEALTH STATE, UNSPECIFIED: ICD-10-CM

## 2022-02-02 PROBLEM — L85.3 XEROSIS CUTIS: Status: ACTIVE | Noted: 2022-02-02

## 2022-02-02 PROBLEM — I10 ESSENTIAL (PRIMARY) HYPERTENSION: Status: ACTIVE | Noted: 2022-02-02

## 2022-02-02 PROCEDURE — ? COSMETIC CONSULTATION: LHR

## 2022-02-02 ASSESSMENT — LOCATION DETAILED DESCRIPTION DERM
LOCATION DETAILED: LEFT SUBMANDIBULAR AREA
LOCATION DETAILED: SUBMENTAL CHIN
LOCATION DETAILED: LEFT LATERAL BUCCAL CHEEK
LOCATION DETAILED: RIGHT LATERAL MALAR CHEEK
LOCATION DETAILED: RIGHT SUBMANDIBULAR AREA
LOCATION DETAILED: RIGHT CHIN
LOCATION DETAILED: RIGHT LATERAL MANDIBULAR CHEEK
LOCATION DETAILED: RIGHT SUPERIOR LATERAL BUCCAL CHEEK
LOCATION DETAILED: LEFT INFERIOR LATERAL MALAR CHEEK
LOCATION DETAILED: LEFT CHIN

## 2022-02-02 ASSESSMENT — LOCATION SIMPLE DESCRIPTION DERM
LOCATION SIMPLE: LEFT SUBMANDIBULAR AREA
LOCATION SIMPLE: SUBMENTAL CHIN
LOCATION SIMPLE: CHIN
LOCATION SIMPLE: RIGHT CHEEK
LOCATION SIMPLE: RIGHT SUBMANDIBULAR AREA
LOCATION SIMPLE: LEFT CHEEK

## 2022-02-02 ASSESSMENT — LOCATION ZONE DERM: LOCATION ZONE: FACE

## 2022-02-28 ENCOUNTER — APPOINTMENT (RX ONLY)
Dept: URBAN - METROPOLITAN AREA CLINIC 329 | Facility: CLINIC | Age: 52
Setting detail: DERMATOLOGY
End: 2022-02-28

## 2022-02-28 DIAGNOSIS — Z41.9 ENCOUNTER FOR PROCEDURE FOR PURPOSES OTHER THAN REMEDYING HEALTH STATE, UNSPECIFIED: ICD-10-CM

## 2022-02-28 PROCEDURE — ? GENTLEMAX

## 2022-02-28 ASSESSMENT — LOCATION DETAILED DESCRIPTION DERM
LOCATION DETAILED: LEFT INFERIOR CENTRAL MALAR CHEEK
LOCATION DETAILED: RIGHT INFERIOR CENTRAL MALAR CHEEK
LOCATION DETAILED: RIGHT CENTRAL BUCCAL CHEEK
LOCATION DETAILED: LEFT CENTRAL BUCCAL CHEEK
LOCATION DETAILED: PHILTRUM
LOCATION DETAILED: SUBMENTAL CHIN

## 2022-02-28 ASSESSMENT — LOCATION SIMPLE DESCRIPTION DERM
LOCATION SIMPLE: SUBMENTAL CHIN
LOCATION SIMPLE: LEFT CHEEK
LOCATION SIMPLE: UPPER LIP
LOCATION SIMPLE: RIGHT CHEEK

## 2022-02-28 ASSESSMENT — LOCATION ZONE DERM
LOCATION ZONE: LIP
LOCATION ZONE: FACE

## 2022-02-28 NOTE — PROCEDURE: GENTLEMAX
Price (Use Numbers Only, No Special Characters Or $): 150
Endpoint: Immediate endpoint: perifollicular erythema and edema. Vaseline and ice applied. Post care reviewed with patient.
Consent: Written consent obtained, risks reviewed including but not limited to crusting, scabbing, blistering, scarring, darker or lighter pigmentary change, paradoxical hair regrowth, incomplete removal of hair and infection.
Detail Level: Detailed
Pulse Duration: 20 ms
Cooling: DCD 40/20
Fluence: 10
Post-Care Instructions: I reviewed with the patient in detail post-care instructions. Patient should avoid sun for a minimum of 4 weeks before and after treatment.
Device Serial Number (Optional): 1 lower face
External Cooling Fan Speed: 0
Spot Size: 18 mm

## 2022-03-30 ENCOUNTER — APPOINTMENT (RX ONLY)
Dept: URBAN - METROPOLITAN AREA CLINIC 329 | Facility: CLINIC | Age: 52
Setting detail: DERMATOLOGY
End: 2022-03-30

## 2022-03-30 DIAGNOSIS — Z41.9 ENCOUNTER FOR PROCEDURE FOR PURPOSES OTHER THAN REMEDYING HEALTH STATE, UNSPECIFIED: ICD-10-CM

## 2022-03-30 PROCEDURE — ? GENTLEMAX

## 2022-03-30 ASSESSMENT — LOCATION SIMPLE DESCRIPTION DERM
LOCATION SIMPLE: SUBMENTAL CHIN
LOCATION SIMPLE: RIGHT CHEEK
LOCATION SIMPLE: CHIN
LOCATION SIMPLE: UPPER LIP
LOCATION SIMPLE: LEFT CHEEK

## 2022-03-30 ASSESSMENT — LOCATION DETAILED DESCRIPTION DERM
LOCATION DETAILED: LEFT SUPERIOR LATERAL BUCCAL CHEEK
LOCATION DETAILED: RIGHT CHIN
LOCATION DETAILED: PHILTRUM
LOCATION DETAILED: SUBMENTAL CHIN
LOCATION DETAILED: LEFT CHIN
LOCATION DETAILED: RIGHT SUPERIOR LATERAL BUCCAL CHEEK

## 2022-03-30 ASSESSMENT — LOCATION ZONE DERM
LOCATION ZONE: LIP
LOCATION ZONE: FACE

## 2022-03-30 NOTE — PROCEDURE: GENTLEMAX
Consent: Written consent obtained, risks reviewed including but not limited to crusting, scabbing, blistering, scarring, darker or lighter pigmentary change, paradoxical hair regrowth, incomplete removal of hair and infection.
Detail Level: Detailed
Endpoint: Immediate endpoint: perifollicular erythema and edema. Vaseline and ice applied. Post care reviewed with patient.
Spot Size: 18 mm
Price (Use Numbers Only, No Special Characters Or $): 150
Fluence: 10
Post-Care Instructions: I reviewed with the patient in detail post-care instructions. Patient should avoid sun for a minimum of 4 weeks before and after treatment.
Cooling: DCD 40/20
Pulse Duration: 10 ms
External Cooling Fan Speed: 0
Device Serial Number (Optional): 2 lower face

## 2022-05-04 ENCOUNTER — APPOINTMENT (RX ONLY)
Dept: URBAN - METROPOLITAN AREA CLINIC 329 | Facility: CLINIC | Age: 52
Setting detail: DERMATOLOGY
End: 2022-05-04

## 2022-05-04 DIAGNOSIS — Z41.9 ENCOUNTER FOR PROCEDURE FOR PURPOSES OTHER THAN REMEDYING HEALTH STATE, UNSPECIFIED: ICD-10-CM

## 2022-05-04 PROCEDURE — ? GENTLEMAX

## 2022-05-04 ASSESSMENT — LOCATION DETAILED DESCRIPTION DERM
LOCATION DETAILED: RIGHT CENTRAL BUCCAL CHEEK
LOCATION DETAILED: LEFT CHIN
LOCATION DETAILED: LEFT CENTRAL BUCCAL CHEEK
LOCATION DETAILED: LEFT SUPERIOR MEDIAL BUCCAL CHEEK
LOCATION DETAILED: SUBMENTAL CHIN
LOCATION DETAILED: PHILTRUM
LOCATION DETAILED: RIGHT SUPERIOR MEDIAL BUCCAL CHEEK

## 2022-05-04 ASSESSMENT — LOCATION SIMPLE DESCRIPTION DERM
LOCATION SIMPLE: UPPER LIP
LOCATION SIMPLE: SUBMENTAL CHIN
LOCATION SIMPLE: CHIN
LOCATION SIMPLE: LEFT CHEEK
LOCATION SIMPLE: RIGHT CHEEK

## 2022-05-04 ASSESSMENT — LOCATION ZONE DERM
LOCATION ZONE: LIP
LOCATION ZONE: FACE

## 2022-05-04 NOTE — PROCEDURE: GENTLEMAX
Spot Size: 18 mm
Fluence: 12
Indication Override: lower face
External Cooling Fan Speed: 0
Cooling Dcd Spray: 40
Post-Care Instructions: I reviewed with the patient in detail post-care instructions. Patient should avoid sun for a minimum of 4 weeks before and after treatment.
Cooling Dcd Delay: 20
Repetition Rate (Hz) Will Not Render If 0: 2
Endpoint: Immediate endpoint: perifollicular erythema and edema. Vaseline and ice applied. Post care reviewed with patient.
Pulse Duration: 10 ms
Price (Use Numbers Only, No Special Characters Or $): 150
Consent: Written consent obtained, risks reviewed including but not limited to crusting, scabbing, blistering, scarring, darker or lighter pigmentary change, paradoxical hair regrowth, incomplete removal of hair and infection.
Treatment Number: 3
Detail Level: Detailed

## 2022-06-16 ENCOUNTER — APPOINTMENT (RX ONLY)
Dept: URBAN - METROPOLITAN AREA CLINIC 329 | Facility: CLINIC | Age: 52
Setting detail: DERMATOLOGY
End: 2022-06-16

## 2022-06-16 DIAGNOSIS — Z41.9 ENCOUNTER FOR PROCEDURE FOR PURPOSES OTHER THAN REMEDYING HEALTH STATE, UNSPECIFIED: ICD-10-CM

## 2022-06-16 PROCEDURE — ? GENTLEMAX

## 2022-06-16 ASSESSMENT — LOCATION DETAILED DESCRIPTION DERM
LOCATION DETAILED: LEFT CENTRAL BUCCAL CHEEK
LOCATION DETAILED: RIGHT MENTAL CREASE
LOCATION DETAILED: RIGHT SUPERIOR LATERAL BUCCAL CHEEK
LOCATION DETAILED: LEFT SUBMANDIBULAR AREA
LOCATION DETAILED: LEFT INFERIOR CENTRAL MALAR CHEEK
LOCATION DETAILED: RIGHT INFERIOR CENTRAL MALAR CHEEK
LOCATION DETAILED: SUBMENTAL CHIN
LOCATION DETAILED: RIGHT SUBMANDIBULAR AREA
LOCATION DETAILED: LEFT CENTRAL ANTERIOR NECK
LOCATION DETAILED: PHILTRUM
LOCATION DETAILED: LEFT LOWER CUTANEOUS LIP
LOCATION DETAILED: RIGHT CENTRAL BUCCAL CHEEK
LOCATION DETAILED: LEFT CHIN
LOCATION DETAILED: LEFT SUPERIOR CENTRAL BUCCAL CHEEK
LOCATION DETAILED: RIGHT CENTRAL ANTERIOR NECK

## 2022-06-16 ASSESSMENT — LOCATION ZONE DERM
LOCATION ZONE: FACE
LOCATION ZONE: LIP
LOCATION ZONE: NECK

## 2022-06-16 ASSESSMENT — LOCATION SIMPLE DESCRIPTION DERM
LOCATION SIMPLE: NECK
LOCATION SIMPLE: RIGHT SUBMANDIBULAR AREA
LOCATION SIMPLE: LEFT LIP
LOCATION SIMPLE: LEFT SUBMANDIBULAR AREA
LOCATION SIMPLE: SUBMENTAL CHIN
LOCATION SIMPLE: UPPER LIP
LOCATION SIMPLE: RIGHT CHEEK
LOCATION SIMPLE: LEFT CHEEK
LOCATION SIMPLE: CHIN

## 2022-06-16 NOTE — PROCEDURE: GENTLEMAX
Consent: Written consent obtained, risks reviewed including but not limited to crusting, scabbing, blistering, scarring, darker or lighter pigmentary change, paradoxical hair regrowth, incomplete removal of hair and infection.
Indication Override: lower face
Endpoint: Immediate endpoint: perifollicular erythema and edema. spf applied post. Post care reviewed with patient.
Pulse Duration: 10 ms
Fluence: 10
Spot Size: 20 mm
Price (Use Numbers Only, No Special Characters Or $): 150
External Cooling Fan Speed: 0
Cooling Dcd Spray: 30
Repetition Rate (Hz) Will Not Render If 0: 2
Post-Care Instructions: I reviewed with the patient in detail post-care instructions. Patient should avoid sun for a minimum of 4 weeks before and after treatment.
Cooling Dcd Delay: 20
Detail Level: Detailed

## 2025-06-19 ENCOUNTER — APPOINTMENT (OUTPATIENT)
Dept: URBAN - METROPOLITAN AREA CLINIC 329 | Facility: CLINIC | Age: 55
Setting detail: DERMATOLOGY
End: 2025-06-19

## 2025-06-19 DIAGNOSIS — Z41.9 ENCOUNTER FOR PROCEDURE FOR PURPOSES OTHER THAN REMEDYING HEALTH STATE, UNSPECIFIED: ICD-10-CM

## 2025-06-19 PROCEDURE — ? GENTLEMAX

## 2025-06-19 ASSESSMENT — LOCATION SIMPLE DESCRIPTION DERM
LOCATION SIMPLE: RIGHT CHEEK
LOCATION SIMPLE: CHIN
LOCATION SIMPLE: LEFT CHEEK
LOCATION SIMPLE: LEFT LIP
LOCATION SIMPLE: SUBMENTAL CHIN

## 2025-06-19 ASSESSMENT — LOCATION DETAILED DESCRIPTION DERM
LOCATION DETAILED: RIGHT CHIN
LOCATION DETAILED: SUBMENTAL CHIN
LOCATION DETAILED: RIGHT MEDIAL BUCCAL CHEEK
LOCATION DETAILED: LEFT PHILTRAL RIDGE
LOCATION DETAILED: LEFT CENTRAL BUCCAL CHEEK

## 2025-06-19 ASSESSMENT — LOCATION ZONE DERM
LOCATION ZONE: LIP
LOCATION ZONE: FACE

## 2025-06-19 NOTE — PROCEDURE: GENTLEMAX
Cooling Dcd Spray: 30
Cooling Dcd Delay: 20
Repetition Rate (Hz) Will Not Render If 0: 2
Post-Care Instructions: I reviewed with the patient in detail post-care instructions. Patient should avoid sun for a minimum of 4 weeks before and after treatment.
Endpoint: Immediate endpoint: perifollicular erythema and edema. spf applied. Post care reviewed with patient.
Treatment Number: 4
Price (Use Numbers Only, No Special Characters Or $): 150
Pulse Duration: 10 ms
Consent: Written consent obtained, risks reviewed including but not limited to crusting, scabbing, blistering, scarring, darker or lighter pigmentary change, paradoxical hair regrowth, incomplete removal of hair and infection.
Detail Level: Detailed
External Cooling Fan Speed: 0
Fluence: 10
Spot Size: 20 mm
Indication Override: lower face

## 2025-07-17 ENCOUNTER — APPOINTMENT (OUTPATIENT)
Dept: URBAN - METROPOLITAN AREA CLINIC 329 | Facility: CLINIC | Age: 55
Setting detail: DERMATOLOGY
End: 2025-07-17

## 2025-07-17 DIAGNOSIS — Z41.9 ENCOUNTER FOR PROCEDURE FOR PURPOSES OTHER THAN REMEDYING HEALTH STATE, UNSPECIFIED: ICD-10-CM

## 2025-07-17 PROCEDURE — ? GENTLEMAX

## 2025-07-17 ASSESSMENT — LOCATION DETAILED DESCRIPTION DERM
LOCATION DETAILED: RIGHT LATERAL BUCCAL CHEEK
LOCATION DETAILED: RIGHT SUBMANDIBULAR AREA
LOCATION DETAILED: LEFT LATERAL SUBMANDIBULAR CHEEK
LOCATION DETAILED: LEFT INFERIOR CENTRAL MALAR CHEEK
LOCATION DETAILED: SUBMENTAL CHIN
LOCATION DETAILED: RIGHT LATERAL MALAR CHEEK
LOCATION DETAILED: RIGHT SUPERIOR LATERAL BUCCAL CHEEK
LOCATION DETAILED: LEFT SUBMANDIBULAR AREA
LOCATION DETAILED: LEFT SUPERIOR CENTRAL BUCCAL CHEEK
LOCATION DETAILED: LEFT MEDIAL MANDIBULAR CHEEK
LOCATION DETAILED: PHILTRUM
LOCATION DETAILED: RIGHT INFERIOR CENTRAL MALAR CHEEK
LOCATION DETAILED: RIGHT CHIN
LOCATION DETAILED: RIGHT MEDIAL MANDIBULAR CHEEK
LOCATION DETAILED: RIGHT LATERAL SUBMANDIBULAR CHEEK
LOCATION DETAILED: LEFT CENTRAL BUCCAL CHEEK

## 2025-07-17 ASSESSMENT — LOCATION SIMPLE DESCRIPTION DERM
LOCATION SIMPLE: LEFT CHEEK
LOCATION SIMPLE: LEFT SUBMANDIBULAR AREA
LOCATION SIMPLE: CHIN
LOCATION SIMPLE: RIGHT SUBMANDIBULAR AREA
LOCATION SIMPLE: SUBMENTAL CHIN
LOCATION SIMPLE: UPPER LIP
LOCATION SIMPLE: RIGHT CHEEK

## 2025-07-17 ASSESSMENT — LOCATION ZONE DERM
LOCATION ZONE: FACE
LOCATION ZONE: LIP

## 2025-07-17 NOTE — PROCEDURE: GENTLEMAX
External Cooling Fan Speed: 0
Fluence: 11
Indication Override: lower face
Spot Size: 20 mm
Consent: Written consent obtained, risks reviewed including but not limited to crusting, scabbing, blistering, scarring, darker or lighter pigmentary change, paradoxical hair regrowth, incomplete removal of hair and infection.
Detail Level: Detailed
Pulse Duration: 5 ms
Price (Use Numbers Only, No Special Characters Or $): 150
Endpoint: Immediate endpoint: perifollicular erythema and edema. spf applied. Post care reviewed with patient.
Repetition Rate (Hz) Will Not Render If 0: 2
Cooling Dcd Delay: 20
Post-Care Instructions: I reviewed with the patient in detail post-care instructions. Patient should avoid sun for a minimum of 4 weeks before and after treatment.
Treatment Number: 5
Cooling Dcd Spray: 30

## (undated) DEVICE — AMD ANTIMICROBIAL GAUZE SPONGES,12 PLY USP TYPE VII, 0.2% POLYHEXAMETHYLENE BIGUANIDE HCI (PHMB): Brand: CURITY

## (undated) DEVICE — CONTAINER SPEC HISTOLOGY 900ML POLYPR

## (undated) DEVICE — PAD,ABDOMINAL,5"X9",STERILE,LF,1/PK: Brand: MEDLINE INDUSTRIES, INC.

## (undated) DEVICE — KENDALL SCD EXPRESS SLEEVES, KNEE LENGTH, MEDIUM: Brand: KENDALL SCD

## (undated) DEVICE — SPONGE LAP 18X18IN STRL -- 5/PK

## (undated) DEVICE — REM POLYHESIVE ADULT PATIENT RETURN ELECTRODE: Brand: VALLEYLAB

## (undated) DEVICE — BLADE ELECTRODE: Brand: VALLEYLAB

## (undated) DEVICE — SUTURE MCRYL SZ 4-0 L27IN ABSRB UD L19MM PS-2 1/2 CIR PRIM Y426H

## (undated) DEVICE — SUTURE PDS II SZ 4-0 L18IN ABSRB UD L19MM PS-2 3/8 CIR PRIM Z496G

## (undated) DEVICE — STERILE COTTON BALLS LARGE 5/P: Brand: MEDLINE

## (undated) DEVICE — TRAY PREP DRY W/ PREM GLV 2 APPL 6 SPNG 2 UNDPD 1 OVERWRAP

## (undated) DEVICE — TRAY CATH 16F DRN BG LTX -- CONVERT TO ITEM 363158

## (undated) DEVICE — GOWN,REINF,POLY,ECL,PP SLV,XL: Brand: MEDLINE

## (undated) DEVICE — BRA SURG XL FOR 40-42IN CHST 96% COT 4% SPANDEX KNIT FAB

## (undated) DEVICE — NEEDLE HYPO 21GA L1.5IN INTRAMUSCULAR S STL LATCH BVL UP

## (undated) DEVICE — DRAPE TWL SURG 16X26IN BLU ORB04] ALLCARE INC]

## (undated) DEVICE — SKIN STAPLER: Brand: SIGNET

## (undated) DEVICE — SUTURE VCRL SZ 2-0 L27IN ABSRB UD L36MM CP-1 1/2 CIR REV J266H

## (undated) DEVICE — SURGICAL PROCEDURE PACK BASIC ST FRANCIS

## (undated) DEVICE — AMD ANTIMICROBIAL BANDAGE ROLL,6 PLY: Brand: KERLIX

## (undated) DEVICE — 2000CC GUARDIAN II: Brand: GUARDIAN

## (undated) DEVICE — UTILITY MARKER,BLACK WITH LABELS: Brand: DEVON

## (undated) DEVICE — SOLUTION IV 1000ML 0.9% SOD CHL

## (undated) DEVICE — SUTURE NONABSORBABLE MONOFILAMENT 5-0 PS-2 18 IN BLK ETHILON 1666H

## (undated) DEVICE — SUT PLN 4-0 18IN PS2 YEL --

## (undated) DEVICE — SUTURE ABSRB X-1 REV CUT 1/2 CIR 22MM UD BRAID 27IN SZ 3-0 J458H

## (undated) DEVICE — DRESSING,GAUZE,XEROFORM,CURAD,5"X9",ST: Brand: CURAD

## (undated) DEVICE — DRAPE,TOP,102X53,STERILE: Brand: MEDLINE